# Patient Record
Sex: FEMALE | Race: WHITE | NOT HISPANIC OR LATINO | Employment: FULL TIME | URBAN - METROPOLITAN AREA
[De-identification: names, ages, dates, MRNs, and addresses within clinical notes are randomized per-mention and may not be internally consistent; named-entity substitution may affect disease eponyms.]

---

## 2021-05-28 ENCOUNTER — RECORDS - HEALTHEAST (OUTPATIENT)
Dept: ADMINISTRATIVE | Facility: CLINIC | Age: 41
End: 2021-05-28

## 2021-08-28 ENCOUNTER — OFFICE VISIT (OUTPATIENT)
Dept: FAMILY MEDICINE | Facility: CLINIC | Age: 41
End: 2021-08-28
Payer: COMMERCIAL

## 2021-08-28 VITALS
WEIGHT: 265.1 LBS | SYSTOLIC BLOOD PRESSURE: 120 MMHG | RESPIRATION RATE: 18 BRPM | DIASTOLIC BLOOD PRESSURE: 74 MMHG | OXYGEN SATURATION: 97 % | TEMPERATURE: 98.4 F | HEART RATE: 78 BPM

## 2021-08-28 DIAGNOSIS — H57.12 LEFT EYE PAIN: Primary | ICD-10-CM

## 2021-08-28 DIAGNOSIS — H53.8 BLURRED VISION: ICD-10-CM

## 2021-08-28 PROCEDURE — 99205 OFFICE O/P NEW HI 60 MIN: CPT | Performed by: NURSE PRACTITIONER

## 2021-08-28 RX ORDER — BUPROPION HYDROCHLORIDE 150 MG/1
150 TABLET, EXTENDED RELEASE ORAL 2 TIMES DAILY
COMMUNITY
Start: 2021-01-24

## 2021-08-28 ASSESSMENT — ENCOUNTER SYMPTOMS: EYE PAIN: 1

## 2021-08-28 NOTE — PROGRESS NOTES
SUBJECTIVE:   Latanya Kelley is a 41 year old female presenting with a chief complaint of   Chief Complaint   Patient presents with     Eye Pain     left eye woke up with it today 8/28/21, feels like ripped cornia, blurred vision       She is a new patient of [facility].    Eye Problem    Onset of symptoms was 1 day ago.   Location: left eye   Course of illness is worsening.    Severity 8-9/10 on the numeric pain scale  Current and Associated symptoms: pain, decreased vision  Treatment measures tried include eye patch applied   Context: No known eye injury, previous history of corneal tear. Reports past history of Lasix surgery and does not wear glasses or contacts worn.    Review of Systems   Eyes: Positive for pain and visual disturbance.       No past medical history on file.  No family history on file.  Current Outpatient Medications   Medication Sig Dispense Refill     buPROPion (WELLBUTRIN SR) 150 MG 12 hr tablet Take 150 mg by mouth 2 times daily       sertraline (ZOLOFT) 50 MG tablet        Social History     Tobacco Use     Smoking status: Never Smoker     Smokeless tobacco: Never Used   Substance Use Topics     Alcohol use: Not on file       OBJECTIVE  /74   Pulse 78   Temp 98.4  F (36.9  C) (Oral)   Resp 18   Wt 120.2 kg (265 lb 1.6 oz)   SpO2 97%     Physical Exam  Constitutional:       Appearance: Normal appearance.   Eyes:      Extraocular Movements: Extraocular movements intact.      Pupils: Pupils are equal, round, and reactive to light.      Comments: LEFT eye with local irritation, injection, tearing noted on exam.   Cardiovascular:      Rate and Rhythm: Normal rate and regular rhythm.      Pulses: Normal pulses.      Heart sounds: Normal heart sounds.   Pulmonary:      Effort: Pulmonary effort is normal.      Breath sounds: Normal breath sounds.   Neurological:      Mental Status: She is alert and oriented to person, place, and time.   Psychiatric:         Behavior: Behavior normal.          Labs:  No results found for this or any previous visit (from the past 24 hour(s)).      ASSESSMENT:    ICD-10-CM    1. Left eye pain  H57.12 Adult Eye Referral   2. Blurred vision  H53.8         Medical Decision Making:    Differential Diagnosis:  Eye Problem: Corneal abrasion  Iritis    Serious Comorbid Conditions:  Adult:  None    PLAN: Patient advised immediate evaluation would be appropriate. Discussed with [facility] provider, Dr. Mckinney, willing to see patient immediately. Referral placed. Patient left room prior to receiving AVS or instructions for eye doctor. Patient called no answer. Dr. Mckinney was advised patient left and will not be seen today.     BRAYAN Petersen CNP      There are no Patient Instructions on file for this visit.